# Patient Record
Sex: FEMALE | Race: WHITE | NOT HISPANIC OR LATINO
[De-identification: names, ages, dates, MRNs, and addresses within clinical notes are randomized per-mention and may not be internally consistent; named-entity substitution may affect disease eponyms.]

---

## 2017-02-07 ENCOUNTER — RESULT REVIEW (OUTPATIENT)
Age: 22
End: 2017-02-07

## 2017-06-25 ENCOUNTER — TRANSCRIPTION ENCOUNTER (OUTPATIENT)
Age: 22
End: 2017-06-25

## 2018-02-11 ENCOUNTER — TRANSCRIPTION ENCOUNTER (OUTPATIENT)
Age: 23
End: 2018-02-11

## 2018-10-03 ENCOUNTER — RESULT REVIEW (OUTPATIENT)
Age: 23
End: 2018-10-03

## 2020-03-21 ENCOUNTER — TRANSCRIPTION ENCOUNTER (OUTPATIENT)
Age: 25
End: 2020-03-21

## 2020-09-18 ENCOUNTER — NON-APPOINTMENT (OUTPATIENT)
Age: 25
End: 2020-09-18

## 2020-09-18 ENCOUNTER — APPOINTMENT (OUTPATIENT)
Dept: INTERNAL MEDICINE | Facility: CLINIC | Age: 25
End: 2020-09-18
Payer: COMMERCIAL

## 2020-09-18 VITALS
SYSTOLIC BLOOD PRESSURE: 110 MMHG | DIASTOLIC BLOOD PRESSURE: 60 MMHG | WEIGHT: 120 LBS | HEART RATE: 88 BPM | BODY MASS INDEX: 20.24 KG/M2 | HEIGHT: 64.57 IN | OXYGEN SATURATION: 99 % | RESPIRATION RATE: 14 BRPM | TEMPERATURE: 97.6 F

## 2020-09-18 DIAGNOSIS — Z78.9 OTHER SPECIFIED HEALTH STATUS: ICD-10-CM

## 2020-09-18 DIAGNOSIS — E28.2 POLYCYSTIC OVARIAN SYNDROME: ICD-10-CM

## 2020-09-18 PROCEDURE — 99385 PREV VISIT NEW AGE 18-39: CPT

## 2020-09-18 RX ORDER — PNV NO.95/FERROUS FUM/FOLIC AC 28MG-0.8MG
TABLET ORAL
Refills: 0 | Status: ACTIVE | COMMUNITY

## 2020-09-18 RX ORDER — ZINC SULFATE 50(220)MG
220 (50 ZN) CAPSULE ORAL
Refills: 0 | Status: ACTIVE | COMMUNITY

## 2020-09-18 NOTE — HISTORY OF PRESENT ILLNESS
[FreeTextEntry1] : Here for physical. Pt reports that she saw an an endocrinologist regarding PCOS. It was recommended that she start spironolactone. However pt decided not to start spironolactone. \par She is currently seeing a . Pt arrives with a long list of lab requests. [de-identified] : Pt reports that she was diagnosed with PCOS at 18yo.\par She was treated with Birth Control pills for 6-7 years.

## 2020-09-18 NOTE — PLAN
[FreeTextEntry1] : Referred pt to Dr. Burger, Endocrinology for management of PCOS\par Pt declines flu vaccine\par Pt declines normal panel of labs for a physical.

## 2020-09-18 NOTE — HEALTH RISK ASSESSMENT
[Excellent] : ~his/her~ current health as excellent [Very Good] : ~his/her~  mood as very good [Yes] : Yes [] : No [de-identified] : social [de-identified] : Exercises 6-7 times per week.

## 2021-02-12 ENCOUNTER — APPOINTMENT (OUTPATIENT)
Dept: OBGYN | Facility: CLINIC | Age: 26
End: 2021-02-12
Payer: COMMERCIAL

## 2021-02-12 ENCOUNTER — LABORATORY RESULT (OUTPATIENT)
Age: 26
End: 2021-02-12

## 2021-02-12 VITALS
DIASTOLIC BLOOD PRESSURE: 70 MMHG | WEIGHT: 120 LBS | SYSTOLIC BLOOD PRESSURE: 106 MMHG | BODY MASS INDEX: 19.99 KG/M2 | HEIGHT: 65 IN

## 2021-02-12 DIAGNOSIS — Z00.00 ENCOUNTER FOR GENERAL ADULT MEDICAL EXAMINATION W/OUT ABNORMAL FINDINGS: ICD-10-CM

## 2021-02-12 DIAGNOSIS — N94.10 UNSPECIFIED DYSPAREUNIA: ICD-10-CM

## 2021-02-12 LAB
BILIRUB UR QL STRIP: NORMAL
CLARITY UR: CLEAR
COLLECTION METHOD: NORMAL
GLUCOSE UR-MCNC: NORMAL
HCG UR QL: 0.2 EU/DL
HGB UR QL STRIP.AUTO: NORMAL
KETONES UR-MCNC: NORMAL
LEUKOCYTE ESTERASE UR QL STRIP: NORMAL
NITRITE UR QL STRIP: NORMAL
PH UR STRIP: 7.5
PROT UR STRIP-MCNC: NORMAL
SP GR UR STRIP: 1.02

## 2021-02-12 PROCEDURE — 99385 PREV VISIT NEW AGE 18-39: CPT | Mod: 25

## 2021-02-12 PROCEDURE — 81003 URINALYSIS AUTO W/O SCOPE: CPT | Mod: QW

## 2021-02-12 PROCEDURE — 99072 ADDL SUPL MATRL&STAF TM PHE: CPT

## 2021-02-14 LAB
C TRACH RRNA SPEC QL NAA+PROBE: NOT DETECTED
N GONORRHOEA RRNA SPEC QL NAA+PROBE: NOT DETECTED
SOURCE TP AMPLIFICATION: NORMAL

## 2021-02-15 ENCOUNTER — TRANSCRIPTION ENCOUNTER (OUTPATIENT)
Age: 26
End: 2021-02-15

## 2021-02-21 ENCOUNTER — NON-APPOINTMENT (OUTPATIENT)
Age: 26
End: 2021-02-21

## 2021-02-21 LAB — CYTOLOGY CVX/VAG DOC THIN PREP: ABNORMAL

## 2021-03-04 ENCOUNTER — APPOINTMENT (OUTPATIENT)
Dept: OBGYN | Facility: CLINIC | Age: 26
End: 2021-03-04

## 2021-03-12 ENCOUNTER — APPOINTMENT (OUTPATIENT)
Dept: OBGYN | Facility: CLINIC | Age: 26
End: 2021-03-12

## 2021-03-26 ENCOUNTER — APPOINTMENT (OUTPATIENT)
Dept: OBGYN | Facility: CLINIC | Age: 26
End: 2021-03-26

## 2021-03-26 ENCOUNTER — NON-APPOINTMENT (OUTPATIENT)
Age: 26
End: 2021-03-26

## 2021-03-26 ENCOUNTER — APPOINTMENT (OUTPATIENT)
Dept: OBGYN | Facility: CLINIC | Age: 26
End: 2021-03-26
Payer: COMMERCIAL

## 2021-03-26 VITALS — SYSTOLIC BLOOD PRESSURE: 108 MMHG | DIASTOLIC BLOOD PRESSURE: 70 MMHG

## 2021-03-26 DIAGNOSIS — R87.810 ATYPICAL SQUAMOUS CELLS OF UNDETERMINED SIGNIFICANCE ON CYTOLOGIC SMEAR OF CERVIX (ASC-US): ICD-10-CM

## 2021-03-26 DIAGNOSIS — R87.610 ATYPICAL SQUAMOUS CELLS OF UNDETERMINED SIGNIFICANCE ON CYTOLOGIC SMEAR OF CERVIX (ASC-US): ICD-10-CM

## 2021-03-26 PROCEDURE — 99072 ADDL SUPL MATRL&STAF TM PHE: CPT

## 2021-03-26 PROCEDURE — 81025 URINE PREGNANCY TEST: CPT

## 2021-03-26 PROCEDURE — 57454 BX/CURETT OF CERVIX W/SCOPE: CPT

## 2021-03-27 LAB — HCG UR QL: NEGATIVE

## 2021-04-02 ENCOUNTER — TRANSCRIPTION ENCOUNTER (OUTPATIENT)
Age: 26
End: 2021-04-02

## 2021-04-07 ENCOUNTER — NON-APPOINTMENT (OUTPATIENT)
Age: 26
End: 2021-04-07

## 2021-04-07 ENCOUNTER — TRANSCRIPTION ENCOUNTER (OUTPATIENT)
Age: 26
End: 2021-04-07

## 2021-04-07 LAB — CORE LAB BIOPSY: NORMAL

## 2021-04-08 ENCOUNTER — TRANSCRIPTION ENCOUNTER (OUTPATIENT)
Age: 26
End: 2021-04-08

## 2021-06-18 ENCOUNTER — APPOINTMENT (OUTPATIENT)
Dept: OBGYN | Facility: CLINIC | Age: 26
End: 2021-06-18

## 2021-06-21 ENCOUNTER — OUTPATIENT (OUTPATIENT)
Dept: OUTPATIENT SERVICES | Facility: HOSPITAL | Age: 26
LOS: 1 days | End: 2021-06-21
Payer: COMMERCIAL

## 2021-06-21 VITALS
WEIGHT: 123.9 LBS | HEIGHT: 65 IN | RESPIRATION RATE: 16 BRPM | SYSTOLIC BLOOD PRESSURE: 105 MMHG | DIASTOLIC BLOOD PRESSURE: 71 MMHG | TEMPERATURE: 98 F | OXYGEN SATURATION: 99 % | HEART RATE: 82 BPM

## 2021-06-21 DIAGNOSIS — N87.9 DYSPLASIA OF CERVIX UTERI, UNSPECIFIED: ICD-10-CM

## 2021-06-21 DIAGNOSIS — Z98.890 OTHER SPECIFIED POSTPROCEDURAL STATES: Chronic | ICD-10-CM

## 2021-06-21 DIAGNOSIS — Z01.818 ENCOUNTER FOR OTHER PREPROCEDURAL EXAMINATION: ICD-10-CM

## 2021-06-21 DIAGNOSIS — Z90.89 ACQUIRED ABSENCE OF OTHER ORGANS: Chronic | ICD-10-CM

## 2021-06-21 LAB
HCT VFR BLD CALC: 36.5 % — SIGNIFICANT CHANGE UP (ref 34.5–45)
HGB BLD-MCNC: 11.9 G/DL — SIGNIFICANT CHANGE UP (ref 11.5–15.5)
MCHC RBC-ENTMCNC: 29.2 PG — SIGNIFICANT CHANGE UP (ref 27–34)
MCHC RBC-ENTMCNC: 32.6 GM/DL — SIGNIFICANT CHANGE UP (ref 32–36)
MCV RBC AUTO: 89.7 FL — SIGNIFICANT CHANGE UP (ref 80–100)
NRBC # BLD: 0 /100 WBCS — SIGNIFICANT CHANGE UP (ref 0–0)
PLATELET # BLD AUTO: 288 K/UL — SIGNIFICANT CHANGE UP (ref 150–400)
RBC # BLD: 4.07 M/UL — SIGNIFICANT CHANGE UP (ref 3.8–5.2)
RBC # FLD: 13.7 % — SIGNIFICANT CHANGE UP (ref 10.3–14.5)
WBC # BLD: 9.13 K/UL — SIGNIFICANT CHANGE UP (ref 3.8–10.5)
WBC # FLD AUTO: 9.13 K/UL — SIGNIFICANT CHANGE UP (ref 3.8–10.5)

## 2021-06-21 PROCEDURE — G0463: CPT

## 2021-06-21 PROCEDURE — 85027 COMPLETE CBC AUTOMATED: CPT

## 2021-06-21 RX ORDER — SODIUM CHLORIDE 9 MG/ML
3 INJECTION INTRAMUSCULAR; INTRAVENOUS; SUBCUTANEOUS EVERY 8 HOURS
Refills: 0 | Status: DISCONTINUED | OUTPATIENT
Start: 2021-06-28 | End: 2021-07-12

## 2021-06-21 RX ORDER — LIDOCAINE HCL 20 MG/ML
0.2 VIAL (ML) INJECTION ONCE
Refills: 0 | Status: DISCONTINUED | OUTPATIENT
Start: 2021-06-28 | End: 2021-07-12

## 2021-06-21 NOTE — H&P PST ADULT - NSICDXPROBLEM_GEN_ALL_CORE_FT
PROBLEM DIAGNOSES  Problem: Dysplasia of cervix  Assessment and Plan: abnormal pap smear   Scheduled for LEEP procedure on 6/28/2021  Pre op instructions provided, patient verbalized understanding.  CBC collected and sent   COvid PCR 6/25/2021  [facility] On admission

## 2021-06-21 NOTE — H&P PST ADULT - NSICDXPASTMEDICALHX_GEN_ALL_CORE_FT
PAST MEDICAL HISTORY:  Abnormal Pap smear of cervix     Dysplasia of cervix     History of fainting as a child 2012 , age 16 , cardiac eval was negative ( allscripts ) - only one episode ,no recurrence    Polycystic ovarian syndrome

## 2021-06-21 NOTE — H&P PST ADULT - NSICDXPASTSURGICALHX_GEN_ALL_CORE_FT
PAST SURGICAL HISTORY:  History of nasal surgery 2009 due to nasal fracture    History of tonsillectomy 2010

## 2021-06-21 NOTE — H&P PST ADULT - HISTORY OF PRESENT ILLNESS
25 yr old female, reports abnormal pap smear and presents to [facility] for scheduled LEEP procedure on 6/28/2021.  25 yr old female, reports abnormal pap smear and presents to [facility] for scheduled LEEP procedure on 6/28/2021. Denies fever, chills, no acute complaints. Denies sick contacts. Covid PCR 6/25/2021 @ [facility].

## 2021-06-23 PROBLEM — Z87.898 PERSONAL HISTORY OF OTHER SPECIFIED CONDITIONS: Chronic | Status: ACTIVE | Noted: 2021-06-21

## 2021-06-23 PROBLEM — R87.619 UNSPECIFIED ABNORMAL CYTOLOGICAL FINDINGS IN SPECIMENS FROM CERVIX UTERI: Chronic | Status: ACTIVE | Noted: 2021-06-21

## 2021-06-23 PROBLEM — E28.2 POLYCYSTIC OVARIAN SYNDROME: Chronic | Status: ACTIVE | Noted: 2021-06-21

## 2021-06-23 PROBLEM — N87.9 DYSPLASIA OF CERVIX UTERI, UNSPECIFIED: Chronic | Status: ACTIVE | Noted: 2021-06-21

## 2021-06-25 ENCOUNTER — OUTPATIENT (OUTPATIENT)
Dept: OUTPATIENT SERVICES | Facility: HOSPITAL | Age: 26
LOS: 1 days | End: 2021-06-25
Payer: COMMERCIAL

## 2021-06-25 DIAGNOSIS — Z11.52 ENCOUNTER FOR SCREENING FOR COVID-19: ICD-10-CM

## 2021-06-25 DIAGNOSIS — Z98.890 OTHER SPECIFIED POSTPROCEDURAL STATES: Chronic | ICD-10-CM

## 2021-06-25 DIAGNOSIS — Z90.89 ACQUIRED ABSENCE OF OTHER ORGANS: Chronic | ICD-10-CM

## 2021-06-25 LAB — SARS-COV-2 RNA SPEC QL NAA+PROBE: SIGNIFICANT CHANGE UP

## 2021-06-25 PROCEDURE — U0005: CPT

## 2021-06-25 PROCEDURE — C9803: CPT

## 2021-06-25 PROCEDURE — U0003: CPT

## 2021-06-27 ENCOUNTER — TRANSCRIPTION ENCOUNTER (OUTPATIENT)
Age: 26
End: 2021-06-27

## 2021-06-28 ENCOUNTER — OUTPATIENT (OUTPATIENT)
Dept: OUTPATIENT SERVICES | Facility: HOSPITAL | Age: 26
LOS: 1 days | End: 2021-06-28
Payer: COMMERCIAL

## 2021-06-28 ENCOUNTER — RESULT REVIEW (OUTPATIENT)
Age: 26
End: 2021-06-28

## 2021-06-28 ENCOUNTER — APPOINTMENT (OUTPATIENT)
Dept: OBGYN | Facility: CLINIC | Age: 26
End: 2021-06-28

## 2021-06-28 VITALS
HEIGHT: 65 IN | TEMPERATURE: 98 F | DIASTOLIC BLOOD PRESSURE: 73 MMHG | RESPIRATION RATE: 16 BRPM | HEART RATE: 96 BPM | WEIGHT: 123.9 LBS | SYSTOLIC BLOOD PRESSURE: 120 MMHG | OXYGEN SATURATION: 100 %

## 2021-06-28 VITALS
HEART RATE: 87 BPM | SYSTOLIC BLOOD PRESSURE: 107 MMHG | OXYGEN SATURATION: 100 % | DIASTOLIC BLOOD PRESSURE: 61 MMHG | RESPIRATION RATE: 16 BRPM | TEMPERATURE: 98 F

## 2021-06-28 DIAGNOSIS — Z90.89 ACQUIRED ABSENCE OF OTHER ORGANS: Chronic | ICD-10-CM

## 2021-06-28 DIAGNOSIS — N87.9 DYSPLASIA OF CERVIX UTERI, UNSPECIFIED: ICD-10-CM

## 2021-06-28 DIAGNOSIS — Z98.890 OTHER SPECIFIED POSTPROCEDURAL STATES: Chronic | ICD-10-CM

## 2021-06-28 DIAGNOSIS — N87.1 MODERATE CERVICAL DYSPLASIA: ICD-10-CM

## 2021-06-28 PROCEDURE — 88305 TISSUE EXAM BY PATHOLOGIST: CPT | Mod: 26

## 2021-06-28 PROCEDURE — 88307 TISSUE EXAM BY PATHOLOGIST: CPT

## 2021-06-28 PROCEDURE — 57505 ENDOCERVICAL CURETTAGE: CPT

## 2021-06-28 PROCEDURE — 88307 TISSUE EXAM BY PATHOLOGIST: CPT | Mod: 26

## 2021-06-28 PROCEDURE — 88305 TISSUE EXAM BY PATHOLOGIST: CPT

## 2021-06-28 PROCEDURE — 57460 BX OF CERVIX W/SCOPE LEEP: CPT

## 2021-06-28 PROCEDURE — 88342 IMHCHEM/IMCYTCHM 1ST ANTB: CPT | Mod: 26

## 2021-06-28 PROCEDURE — 57522 CONIZATION OF CERVIX: CPT

## 2021-06-28 PROCEDURE — 88342 IMHCHEM/IMCYTCHM 1ST ANTB: CPT

## 2021-06-28 RX ORDER — OXYCODONE HYDROCHLORIDE 5 MG/1
5 TABLET ORAL ONCE
Refills: 0 | Status: DISCONTINUED | OUTPATIENT
Start: 2021-06-28 | End: 2021-06-28

## 2021-06-28 RX ORDER — SODIUM CHLORIDE 9 MG/ML
1000 INJECTION, SOLUTION INTRAVENOUS
Refills: 0 | Status: DISCONTINUED | OUTPATIENT
Start: 2021-06-28 | End: 2021-07-12

## 2021-06-28 RX ORDER — ONDANSETRON 8 MG/1
4 TABLET, FILM COATED ORAL ONCE
Refills: 0 | Status: DISCONTINUED | OUTPATIENT
Start: 2021-06-28 | End: 2021-07-12

## 2021-06-28 NOTE — BRIEF OPERATIVE NOTE - NSICDXBRIEFPROCEDURE_GEN_ALL_CORE_FT
PROCEDURES:  Loop electrosurgical excision procedure (LEEP) 28-Jun-2021 08:46:33  [facility]  Curettage, endocervical 28-Jun-2021 08:46:53  [facility]

## 2021-06-28 NOTE — ASU PATIENT PROFILE, ADULT - PMH
Abnormal Pap smear of cervix    Dysplasia of cervix    History of fainting as a child  2012 , age 16 , cardiac eval was negative ( allscripts ) - only one episode ,no recurrence  Polycystic ovarian syndrome

## 2021-06-28 NOTE — BRIEF OPERATIVE NOTE - COMMENTS
Posterior aspect of the transformation zone was larger than the anterior aspect.  They were thus taken as two separate specimens, Anterior marked at 12 o'clock and posterior marked at 6 o'clock.  ECC taken separately. Posterior aspect of the transformation zone was larger than the anterior aspect.  They were thus taken as two separate specimens, Anterior marked at 12 o'clock and posterior marked at 6 o'clock.  ECC taken separately.    Dictation number [id number]

## 2021-07-06 LAB — SURGICAL PATHOLOGY STUDY: SIGNIFICANT CHANGE UP

## 2021-07-27 ENCOUNTER — APPOINTMENT (OUTPATIENT)
Dept: OBGYN | Facility: CLINIC | Age: 26
End: 2021-07-27
Payer: COMMERCIAL

## 2021-07-27 VITALS — SYSTOLIC BLOOD PRESSURE: 110 MMHG | DIASTOLIC BLOOD PRESSURE: 70 MMHG

## 2021-07-27 DIAGNOSIS — N89.8 OTHER SPECIFIED NONINFLAMMATORY DISORDERS OF VAGINA: ICD-10-CM

## 2021-07-27 DIAGNOSIS — N87.1 MODERATE CERVICAL DYSPLASIA: ICD-10-CM

## 2021-07-27 PROCEDURE — 99213 OFFICE O/P EST LOW 20 MIN: CPT

## 2021-07-27 PROCEDURE — 99072 ADDL SUPL MATRL&STAF TM PHE: CPT

## 2021-07-28 LAB
CANDIDA VAG CYTO: NOT DETECTED
G VAGINALIS+PREV SP MTYP VAG QL MICRO: DETECTED
T VAGINALIS VAG QL WET PREP: NOT DETECTED

## 2021-07-28 RX ORDER — METRONIDAZOLE 500 MG/1
500 TABLET ORAL
Qty: 14 | Refills: 0 | Status: ACTIVE | COMMUNITY
Start: 2021-07-28 | End: 1900-01-01

## 2021-08-17 ENCOUNTER — APPOINTMENT (OUTPATIENT)
Dept: OBGYN | Facility: CLINIC | Age: 26
End: 2021-08-17
Payer: MEDICAID

## 2021-08-17 VITALS — DIASTOLIC BLOOD PRESSURE: 70 MMHG | SYSTOLIC BLOOD PRESSURE: 110 MMHG

## 2021-08-17 PROCEDURE — 99213 OFFICE O/P EST LOW 20 MIN: CPT

## 2021-10-15 ENCOUNTER — APPOINTMENT (OUTPATIENT)
Dept: OBGYN | Facility: CLINIC | Age: 26
End: 2021-10-15

## 2021-10-27 ENCOUNTER — APPOINTMENT (OUTPATIENT)
Dept: OBGYN | Facility: CLINIC | Age: 26
End: 2021-10-27
Payer: COMMERCIAL

## 2021-10-27 VITALS — DIASTOLIC BLOOD PRESSURE: 70 MMHG | SYSTOLIC BLOOD PRESSURE: 108 MMHG

## 2021-10-27 PROCEDURE — 57454 BX/CURETT OF CERVIX W/SCOPE: CPT

## 2021-10-28 LAB — HPV HIGH+LOW RISK DNA PNL CVX: NOT DETECTED

## 2021-11-02 LAB — CYTOLOGY CVX/VAG DOC THIN PREP: ABNORMAL

## 2021-11-03 ENCOUNTER — NON-APPOINTMENT (OUTPATIENT)
Age: 26
End: 2021-11-03

## 2022-12-30 ENCOUNTER — APPOINTMENT (OUTPATIENT)
Dept: OBGYN | Facility: CLINIC | Age: 27
End: 2022-12-30

## 2023-01-17 ENCOUNTER — APPOINTMENT (OUTPATIENT)
Dept: OBGYN | Facility: CLINIC | Age: 28
End: 2023-01-17
Payer: COMMERCIAL

## 2023-01-17 VITALS
DIASTOLIC BLOOD PRESSURE: 84 MMHG | SYSTOLIC BLOOD PRESSURE: 125 MMHG | BODY MASS INDEX: 20.83 KG/M2 | WEIGHT: 125 LBS | HEIGHT: 65 IN

## 2023-01-17 DIAGNOSIS — Z98.890 OTHER SPECIFIED POSTPROCEDURAL STATES: ICD-10-CM

## 2023-01-17 DIAGNOSIS — B37.31 ACUTE CANDIDIASIS OF VULVA AND VAGINA: ICD-10-CM

## 2023-01-17 DIAGNOSIS — R79.89 OTHER SPECIFIED ABNORMAL FINDINGS OF BLOOD CHEMISTRY: ICD-10-CM

## 2023-01-17 DIAGNOSIS — Z01.419 ENCOUNTER FOR GYNECOLOGICAL EXAMINATION (GENERAL) (ROUTINE) W/OUT ABNORMAL FINDINGS: ICD-10-CM

## 2023-01-17 PROCEDURE — 99395 PREV VISIT EST AGE 18-39: CPT

## 2023-01-17 PROCEDURE — 57505 ENDOCERVICAL CURETTAGE: CPT

## 2023-01-17 PROCEDURE — 36415 COLL VENOUS BLD VENIPUNCTURE: CPT

## 2023-01-17 PROCEDURE — 99213 OFFICE O/P EST LOW 20 MIN: CPT | Mod: 25

## 2023-01-17 RX ORDER — CLOTRIMAZOLE AND BETAMETHASONE DIPROPIONATE 10; .5 MG/G; MG/G
1-0.05 CREAM TOPICAL
Qty: 1 | Refills: 0 | Status: ACTIVE | COMMUNITY
Start: 2023-01-17 | End: 1900-01-01

## 2023-01-17 RX ORDER — FLUCONAZOLE 150 MG/1
150 TABLET ORAL
Qty: 3 | Refills: 1 | Status: ACTIVE | COMMUNITY
Start: 2023-01-17 | End: 1900-01-01

## 2023-01-17 NOTE — COUNSELING
[Nutrition/ Exercise/ Weight Management] : nutrition, exercise, weight management [Vitamins/Supplements] : vitamins/supplements [Breast Self Exam] : breast self exam [Contraception/ Emergency Contraception/ Safe Sexual Practices] : contraception, emergency contraception, safe sexual practices [Confidentiality] : confidentiality [Vaccines] : vaccines

## 2023-01-17 NOTE — HISTORY OF PRESENT ILLNESS
[Patient reported PAP Smear was normal] : Patient reported PAP Smear was normal [Currently Active] : currently active [Men] : men [No] : No [Yes] : Yes [Condoms] : Condoms [Patient refuses STI testing] : Patient refuses STI testing [FreeTextEntry1] : 2023. KAREN PARRA 27 year old female G0 LMP 22.She presents for an annual gyn exam.\par \par She reports monthly menses, not too heavy, not painful. She denies intermenstrual bleeding.\par \par She notes vulvar, vaginal itch,  increased white discharge, redness and burning. She has used Monistat but it has not resolved.  She denies abdominal and pelvic pain. She reports normal urination, no dysuria, no incontinence of urine. BM is normal per patient, no blood in stool or constipation/diarrhea.\par \par Patient is currently sexually active with her fiance. They use condoms for contraception.\par \par No new medical conditions, medications or surgeries since we last saw each other.\par \par Patient has not seen PCP in a year due change of insurance but is planning on making an appointment. \par \par Obhx: \par GYNhx: possible PCOS, colposcopy on 10/21- [facility]DEE 1, ECC 10/21 benign. Denies history of fibroids, STI, pelvic infection, breast issues, ovarian cysts\par PMH: none\par PSH: LEEP on - LEEP-- 1. Cervix, Anterior Lip Leep- High grade squamous intraepithelial lesions (HISL), High grade dysplasia is not at the surgical margins. 2. Cervix, Posterior Lip, LEEP- Extensive high grade squamous intraepithelial lesion (HILS), High grade dysplasia involves/ extends  into the endocervical gland margin at multiple foci. 3. ECC- detached fragments of high grade squamous intraepithelial lesion (HSIL) (supported by p16 immunophenotype), benign fragments of endocervical tissue is also present\par Med:  none\par All: Prednisone -hives\par Soc: soc alc, no drugs, non smoker.  at [facility] in [place]. \par Famhx: Denies FHx of breast, ovarian, uterine, colon, pancreatic, or prostate cancer.\par \par s/p Gardasil x3, denies covid vaccines [TextBox_4] : LEEP-7/21- 1. Cervix, Anterior Lip Leep- High grade squamous intraepithelial lesions (HISL), High grade dysplasia is not at the surgical margins. 2. Cervix, Posterior Lip, LEEP- Extensive high grade squamous intraepithelial lesion (HILS), High grade dysplasia involves/ extends  into the endocervical gland margin at multiple foci. 3. ECC- detached fragments of high grade squamous intraepithelial lesion (HSIL) (supported by p16 immunophenotype), benign fragments of endocervical tissue is also present [PapSmeardate] : 10/21 [TextBox_31] : NILM. HPV not detected

## 2023-01-17 NOTE — PLAN
[FreeTextEntry1] : Routine Gyn Exam:\par BSA, calcium, vitamin D and exercise d/w pt\par Pap smear and HPV conducted and GC/Chl off pap \par Advised pt to see PCP and dermatologist annually\par \par Yeast vaginitis: \par Advised to use fragrance free and hypo allergenic soaps and lotions\par Lose fitting, cotton clothing\par Rx given for Diflucan. \par Rx given for Lotrisone. Apply  2x/day for 7 days- use small amt at first on small area of skin- due to hx of rxn to prednisone\par Avoid sitting in wet clothing for long periods of time\par RTO if sxs recur\par \par Hx of HPV\par ECC conducted today\par Monsels for hemostasis\par Nothing vaginally for 1 week\par D/w pt normal dark brown spotting that may occur after procedure\par Nothing per vagina including tampons or intercourse for 1 week\par Pt to call MD if she has heavy vaginal bleeding, fever or chills\par \par RTO in 1 year or PRN

## 2023-01-17 NOTE — PHYSICAL EXAM
[Chaperone Present] : A chaperone was present in the examining room during all aspects of the physical examination [Appropriately responsive] : appropriately responsive [Alert] : alert [No Acute Distress] : no acute distress [No Lymphadenopathy] : no lymphadenopathy [Regular Rate Rhythm] : regular rate rhythm [No Murmurs] : no murmurs [Clear to Auscultation B/L] : clear to auscultation bilaterally [Soft] : soft [Non-tender] : non-tender [Non-distended] : non-distended [No HSM] : No HSM [No Lesions] : no lesions [No Mass] : no mass [Oriented x3] : oriented x3 [Examination Of The Breasts] : a normal appearance [No Masses] : no breast masses were palpable [Labia Majora] : normal [Labia Minora] : normal [Discharge] : a  ~M vaginal discharge was present [Moderate] : moderate [White] : white [Normal] : normal [Uterine Adnexae] : normal [Thick] : thick [FreeTextEntry1] : VUlvar erythema

## 2023-01-18 DIAGNOSIS — R79.89 OTHER SPECIFIED ABNORMAL FINDINGS OF BLOOD CHEMISTRY: ICD-10-CM

## 2023-01-18 LAB
C TRACH RRNA SPEC QL NAA+PROBE: NOT DETECTED
CANDIDA VAG CYTO: DETECTED
DHEA-S SERPL-MCNC: 288 UG/DL
ESTIMATED AVERAGE GLUCOSE: 105 MG/DL
ESTRADIOL SERPL-MCNC: 155 PG/ML
FSH SERPL-MCNC: 2.4 IU/L
G VAGINALIS+PREV SP MTYP VAG QL MICRO: DETECTED
GLUCOSE SERPL-MCNC: 68 MG/DL
HBA1C MFR BLD HPLC: 5.3 %
HCG SERPL-MCNC: <1 MIU/ML
HPV HIGH+LOW RISK DNA PNL CVX: NOT DETECTED
INSULIN P FAST SERPL-ACNC: 11.1 UU/ML
LH SERPL-ACNC: 2.8 IU/L
N GONORRHOEA RRNA SPEC QL NAA+PROBE: NOT DETECTED
PROLACTIN SERPL-MCNC: 30.6 NG/ML
SOURCE TP AMPLIFICATION: NORMAL
T VAGINALIS VAG QL WET PREP: NOT DETECTED
T4 FREE SERPL-MCNC: 1.2 NG/DL
TSH SERPL-ACNC: 1.61 UIU/ML

## 2023-01-19 ENCOUNTER — NON-APPOINTMENT (OUTPATIENT)
Age: 28
End: 2023-01-19

## 2023-01-24 LAB
CORE LAB BIOPSY: NORMAL
CYTOLOGY CVX/VAG DOC THIN PREP: ABNORMAL
TESTOST FREE SERPL-MCNC: 2.2 PG/ML
TESTOST SERPL-MCNC: 35.7 NG/DL

## 2023-01-24 RX ORDER — METRONIDAZOLE 7.5 MG/G
0.75 GEL VAGINAL
Qty: 1 | Refills: 0 | Status: ACTIVE | COMMUNITY
Start: 2023-01-19 | End: 1900-01-01

## 2023-01-26 LAB
INSULIN FREE SERPL-MCNC: 6.2 UU/ML
INSULIN: 6.2 UU/ML

## 2023-02-03 LAB — 17OHP SERPL-MCNC: 108 NG/DL

## 2024-10-10 NOTE — H&P PST ADULT - EXTREMITIES
pt was seen yesterday for abdominal pain, had ct done. called back to the ED by DR HAY for abnormal CT. states she was told she needs a ultrasound. c/o LLQ abdominal pain. LMP 10/05. no history
detailed exam